# Patient Record
Sex: FEMALE | Race: WHITE | HISPANIC OR LATINO | URBAN - METROPOLITAN AREA
[De-identification: names, ages, dates, MRNs, and addresses within clinical notes are randomized per-mention and may not be internally consistent; named-entity substitution may affect disease eponyms.]

---

## 2022-01-24 ENCOUNTER — TELEPHONE (OUTPATIENT)
Dept: OBSTETRICS AND GYNECOLOGY | Facility: CLINIC | Age: 22
End: 2022-01-24

## 2022-01-24 NOTE — TELEPHONE ENCOUNTER
----- Message from Marta Mae MA sent at 1/24/2022  9:46 AM CST -----  Type:  Needs Medical Advice    Who Called: pt  Regarding: returning call to Kathleen  Would the patient rather a call back or a response via [facility]? Call back  Best Call Back Number: [phone]  Additional Information: n/a

## 2022-01-24 NOTE — TELEPHONE ENCOUNTER
----- Message from Rosa Velasquez sent at 1/24/2022  9:19 AM CST -----  Regarding: call back  Contact: [phone]  Type:  Sooner Apoointment Request    Caller is requesting a sooner appointment.  Caller declined first available appointment listed below.  Caller will not accept being placed on the waitlist and is requesting a message be sent to doctor.  Name of Caller: PT   When is the first available appointment? April   Symptoms: Annual confirm preg self pay   Would the patient rather a call back or a response via [facility]? Call back   Best Call Back Number: [phone]  Additional Information: no

## 2022-02-17 ENCOUNTER — TELEPHONE (OUTPATIENT)
Dept: OBSTETRICS AND GYNECOLOGY | Facility: CLINIC | Age: 22
End: 2022-02-17

## 2022-02-17 NOTE — TELEPHONE ENCOUNTER
----- Message from Nava Hodges sent at 2/15/2022  3:10 PM CST -----  Type:  Needs Medical Advice    Who Called: pt  Symptoms (please be specific): would like to schedule appt for new pregnant    How long has patient had these symptoms:    Pharmacy name and phone #:    Would the patient rather a call back or a response via [facility]? call  Best Call Back Number: [phone]  Additional Information: pt want to be seen before 10 weeks

## 2022-02-28 ENCOUNTER — HOSPITAL ENCOUNTER (EMERGENCY)
Facility: HOSPITAL | Age: 22
Discharge: HOME | End: 2022-02-28
Attending: FAMILY MEDICINE

## 2022-02-28 VITALS
BODY MASS INDEX: 28.44 KG/M2 | DIASTOLIC BLOOD PRESSURE: 58 MMHG | OXYGEN SATURATION: 98 % | HEIGHT: 64 IN | SYSTOLIC BLOOD PRESSURE: 110 MMHG | HEART RATE: 82 BPM | RESPIRATION RATE: 19 BRPM | WEIGHT: 166.56 LBS | TEMPERATURE: 99 F

## 2022-02-28 DIAGNOSIS — R10.9 ABDOMINAL PAIN AFFECTING PREGNANCY: Primary | ICD-10-CM

## 2022-02-28 DIAGNOSIS — O26.899 ABDOMINAL PAIN AFFECTING PREGNANCY: Primary | ICD-10-CM

## 2022-02-28 LAB
ABO GROUP BLD: NORMAL
ALBUMIN SERPL BCP-MCNC: 4.3 G/DL (ref 3.5–5.2)
ALP SERPL-CCNC: 53 U/L (ref 38–126)
ALT SERPL W/O P-5'-P-CCNC: 11 U/L (ref 10–44)
ANION GAP SERPL CALC-SCNC: 9 MMOL/L (ref 8–16)
AST SERPL-CCNC: 20 U/L (ref 15–46)
B-HCG UR QL: POSITIVE
BACTERIA GENITAL QL WET PREP: ABNORMAL
BASOPHILS # BLD AUTO: 0.05 K/UL (ref 0–0.2)
BASOPHILS NFR BLD: 0.4 % (ref 0–1.9)
BILIRUB SERPL-MCNC: 0.5 MG/DL (ref 0.1–1)
BILIRUB UR QL STRIP: NEGATIVE
CALCIUM SERPL-MCNC: 9.4 MG/DL (ref 8.7–10.5)
CHLORIDE SERPL-SCNC: 102 MMOL/L (ref 95–110)
CLARITY UR REFRACT.AUTO: ABNORMAL
CLUE CELLS VAG QL WET PREP: ABNORMAL
CO2 SERPL-SCNC: 25 MMOL/L (ref 23–29)
COLOR UR AUTO: ABNORMAL
CREAT SERPL-MCNC: 0.63 MG/DL (ref 0.5–1.4)
DIFFERENTIAL METHOD: ABNORMAL
EOSINOPHIL # BLD AUTO: 0.1 K/UL (ref 0–0.5)
EOSINOPHIL NFR BLD: 0.8 % (ref 0–8)
ERYTHROCYTE [DISTWIDTH] IN BLOOD BY AUTOMATED COUNT: 11.9 % (ref 11.5–14.5)
EST. GFR  (AFRICAN AMERICAN): >60 ML/MIN/1.73 M^2
EST. GFR  (NON AFRICAN AMERICAN): >60 ML/MIN/1.73 M^2
FILAMENT FUNGI VAG WET PREP-#/AREA: ABNORMAL
GLUCOSE SERPL-MCNC: 86 MG/DL (ref 70–110)
GLUCOSE UR QL STRIP: NEGATIVE
HCG INTACT+B SERPL-ACNC: NORMAL MIU/ML
HCT VFR BLD AUTO: 38 % (ref 37–48.5)
HGB BLD-MCNC: 13 G/DL (ref 12–16)
HGB UR QL STRIP: ABNORMAL
IMM GRANULOCYTES # BLD AUTO: 0.02 K/UL (ref 0–0.04)
IMM GRANULOCYTES NFR BLD AUTO: 0.2 % (ref 0–0.5)
KETONES UR QL STRIP: NEGATIVE
LEUKOCYTE ESTERASE UR QL STRIP: NEGATIVE
LIPASE SERPL-CCNC: 48 U/L (ref 23–300)
LYMPHOCYTES # BLD AUTO: 2 K/UL (ref 1–4.8)
LYMPHOCYTES NFR BLD: 17.5 % (ref 18–48)
MCH RBC QN AUTO: 32.2 PG (ref 27–31)
MCHC RBC AUTO-ENTMCNC: 34.2 G/DL (ref 32–36)
MCV RBC AUTO: 94 FL (ref 82–98)
MONOCYTES # BLD AUTO: 0.7 K/UL (ref 0.3–1)
MONOCYTES NFR BLD: 6.3 % (ref 4–15)
NEUTROPHILS # BLD AUTO: 8.6 K/UL (ref 1.8–7.7)
NEUTROPHILS NFR BLD: 74.8 % (ref 38–73)
NITRITE UR QL STRIP: NEGATIVE
NRBC BLD-RTO: 0 /100 WBC
PH UR STRIP: 6 [PH] (ref 5–8)
PLATELET # BLD AUTO: 197 K/UL (ref 150–450)
PMV BLD AUTO: 11.6 FL (ref 9.2–12.9)
POTASSIUM SERPL-SCNC: 3.5 MMOL/L (ref 3.5–5.1)
PROT SERPL-MCNC: 7.6 G/DL (ref 6–8.4)
PROT UR QL STRIP: ABNORMAL
RBC # BLD AUTO: 4.04 M/UL (ref 4–5.4)
RH BLD: NORMAL
SODIUM SERPL-SCNC: 136 MMOL/L (ref 136–145)
SP GR UR STRIP: 1.01 (ref 1–1.03)
SPECIMEN SOURCE: ABNORMAL
T VAGINALIS GENITAL QL WET PREP: ABNORMAL
URN SPEC COLLECT METH UR: ABNORMAL
UROBILINOGEN UR STRIP-ACNC: NEGATIVE EU/DL
UUN UR-MCNC: 8 MG/DL (ref 7–17)
WBC # BLD AUTO: 11.44 K/UL (ref 3.9–12.7)
WBC #/AREA VAG WET PREP: ABNORMAL
YEAST GENITAL QL WET PREP: ABNORMAL

## 2022-02-28 PROCEDURE — 85025 COMPLETE CBC W/AUTO DIFF WBC: CPT | Mod: ER | Performed by: FAMILY MEDICINE

## 2022-02-28 PROCEDURE — 87491 CHLMYD TRACH DNA AMP PROBE: CPT | Mod: ER | Performed by: FAMILY MEDICINE

## 2022-02-28 PROCEDURE — 99285 EMERGENCY DEPT VISIT HI MDM: CPT | Mod: 25,ER

## 2022-02-28 PROCEDURE — 81025 URINE PREGNANCY TEST: CPT | Mod: ER | Performed by: FAMILY MEDICINE

## 2022-02-28 PROCEDURE — 80053 COMPREHEN METABOLIC PANEL: CPT | Mod: ER | Performed by: FAMILY MEDICINE

## 2022-02-28 PROCEDURE — 86901 BLOOD TYPING SEROLOGIC RH(D): CPT | Performed by: FAMILY MEDICINE

## 2022-02-28 PROCEDURE — 96360 HYDRATION IV INFUSION INIT: CPT | Mod: ER

## 2022-02-28 PROCEDURE — 83690 ASSAY OF LIPASE: CPT | Mod: ER | Performed by: FAMILY MEDICINE

## 2022-02-28 PROCEDURE — 81003 URINALYSIS AUTO W/O SCOPE: CPT | Mod: ER | Performed by: FAMILY MEDICINE

## 2022-02-28 PROCEDURE — 25000003 PHARM REV CODE 250: Mod: ER | Performed by: PHYSICIAN ASSISTANT

## 2022-02-28 PROCEDURE — 87591 N.GONORRHOEAE DNA AMP PROB: CPT | Mod: ER | Performed by: FAMILY MEDICINE

## 2022-02-28 PROCEDURE — 87210 SMEAR WET MOUNT SALINE/INK: CPT | Mod: ER | Performed by: FAMILY MEDICINE

## 2022-02-28 PROCEDURE — 84702 CHORIONIC GONADOTROPIN TEST: CPT | Mod: ER | Performed by: FAMILY MEDICINE

## 2022-02-28 RX ORDER — FAMOTIDINE 20 MG
1 TABLET ORAL DAILY
Qty: 30 TABLET | Refills: 0 | Status: SHIPPED | OUTPATIENT
Start: 2022-02-28 | End: 2023-02-28

## 2022-02-28 RX ORDER — METRONIDAZOLE 7.5 MG/G
1 GEL VAGINAL NIGHTLY
Qty: 5 APPLICATOR | Refills: 0 | Status: SHIPPED | OUTPATIENT
Start: 2022-02-28 | End: 2022-03-05

## 2022-02-28 RX ADMIN — SODIUM CHLORIDE 1000 ML: 0.9 INJECTION, SOLUTION INTRAVENOUS at 11:02

## 2022-02-28 NOTE — ED PROVIDER NOTES
"Encounter Date: 2/28/2022       History     Chief Complaint   Patient presents with    Abdominal Pain     Pt reports abd pain and "pinching." Pt pregnant. States "I knew about pain and pinching since I was pregnant." LMP 12-25-21. Denies vaginal bleeding, reports white discharge. PA in triage.     21-year-old female presents emergency department for evaluation of right-sided pelvic pain and positive pregnancy test.  She reports via tele  that her last menstrual period was on 12/25/2021.  She reports that the pelvic pain has been an achy, intermittently "pinching" pain in her right pelvis since her last menstrual cycle.  She reports that she took a home pregnancy test and was positive.  She reports that she has attempted to set up an appointment with a OBGYN, however reports that she cannot get an appointment until she is at least 10 weeks gestation.  She states that she is approximately 9 weeks today.  She denies any vaginal bleeding.  She does report an intermittent white vaginal discharge.  She denies any fever, headache, dizziness, neck pain, chest pain, shortness of breath, nausea, vomiting, diarrhea, flank pain or dysuria.  No treatment was attempted at home or prior to arrival today.  She reports that this is her 1st pregnancy.        Review of patient's allergies indicates:  No Known Allergies  Past Medical History:   Diagnosis Date    Dysmenorrhea, unspecified      Past Surgical History:   Procedure Laterality Date    APPENDECTOMY      TONSILLECTOMY       History reviewed. No pertinent family history.  Social History     Tobacco Use    Smoking status: Former Smoker    Smokeless tobacco: Never Used   Substance Use Topics    Alcohol use: Not Currently    Drug use: Never     Review of Systems   Constitutional: Negative for activity change, appetite change and fever.   HENT: Negative for congestion, rhinorrhea, sore throat, trouble swallowing and voice change.    Eyes: Negative for photophobia " and visual disturbance.   Respiratory: Negative for cough, chest tightness, shortness of breath and wheezing.    Cardiovascular: Negative for chest pain.   Gastrointestinal: Negative for abdominal pain, constipation, diarrhea, nausea and vomiting.   Genitourinary: Positive for pelvic pain and vaginal discharge. Negative for decreased urine volume, dysuria, flank pain, frequency, menstrual problem, vaginal bleeding and vaginal pain.   Musculoskeletal: Negative for back pain and neck pain.   Skin: Negative for rash.   Neurological: Negative for dizziness, syncope and headaches.       Physical Exam     Initial Vitals [02/28/22 1004]   BP Pulse Resp Temp SpO2   (!) 153/67 103 19 98.7 °F (37.1 °C) 100 %      MAP       --         Physical Exam    Nursing note and vitals reviewed.  Constitutional: She appears well-developed and well-nourished. She is not diaphoretic. No distress.   HENT:   Head: Normocephalic and atraumatic.   Right Ear: External ear normal.   Left Ear: External ear normal.   Nose: Nose normal.   Mouth/Throat: Oropharynx is clear and moist.   Eyes: Conjunctivae and EOM are normal. Pupils are equal, round, and reactive to light.   Neck: Neck supple.   Normal range of motion.  Cardiovascular: Normal rate, regular rhythm and normal heart sounds.   Pulmonary/Chest: Breath sounds normal. No respiratory distress. She has no wheezes. She has no rhonchi. She has no rales. She exhibits no tenderness.   Abdominal: Abdomen is soft. Bowel sounds are normal. She exhibits no distension. There is no abdominal tenderness.   No right CVA tenderness.  No left CVA tenderness. There is no rebound.   Musculoskeletal:      Cervical back: Normal range of motion and neck supple.     Lymphadenopathy:     She has no cervical adenopathy.   Neurological: She is alert and oriented to person, place, and time.   Skin: Skin is warm and dry.   Psychiatric: She has a normal mood and affect.         ED Course   Procedures  Labs Reviewed    PREGNANCY TEST, URINE RAPID - Abnormal; Notable for the following components:       Result Value    Preg Test, Ur Positive (*)     All other components within normal limits    Narrative:     Specimen Source->Urine   URINALYSIS, REFLEX TO URINE CULTURE - Abnormal; Notable for the following components:    Appearance, UA Hazy (*)     Protein, UA Trace (*)     Occult Blood UA Trace (*)     All other components within normal limits    Narrative:     Preferred Collection Type->Urine, Clean Catch  Specimen Source->Urine  Collection Type->Urine, Clean Catch   CBC W/ AUTO DIFFERENTIAL - Abnormal; Notable for the following components:    MCH 32.2 (*)     Gran # (ANC) 8.6 (*)     Gran % 74.8 (*)     Lymph % 17.5 (*)     All other components within normal limits    Narrative:     Release to patient->Immediate   VAGINAL SCREEN - Abnormal; Notable for the following components:    Clue Cells Rare (*)     WBC - Vaginal Screen Occasional (*)     Bacteria - Vaginal Screen Moderate (*)     All other components within normal limits    Narrative:     Specimen Source->Vagina  Release to patient->Immediate   C. TRACHOMATIS/N. GONORRHOEAE BY AMP DNA   COMPREHENSIVE METABOLIC PANEL    Narrative:     Release to patient->Immediate   HCG, QUANTITATIVE    Narrative:     Release to patient->Immediate   LIPASE    Narrative:     Release to patient->Immediate   GROUP & RH          Imaging Results          US OB <14 Wks, TransAbd, Single Gestation (Final result)  Result time 02/28/22 11:15:26   Procedure changed from US OB <14 Wks TransAbd & TransVag, Single Gestation (XPD)     Final result by Gerardo Menjivar MD (02/28/22 11:15:26)                 Impression:      Single live intrauterine pregnancy with estimated gestational age 9 weeks 2 days.    6.4 cm right ovarian simple cyst.  Recommend attention on expected ultrasound follow-up.      Electronically signed by: Gerardo Menjivar  Date:    02/28/2022  Time:    11:15             Narrative:     EXAMINATION:  US OB <14 WEEKS TRANSABDOM, SINGLE GESTATION    CLINICAL HISTORY:  abd pain;    TECHNIQUE:  Transabdominal sonography of the pelvis was performed, followed by transvaginal sonography to better evaluate the uterus and ovaries.    COMPARISON:  None.    FINDINGS:  Intrauterine gestation(s): Single    Gestational sac: Present with satisfactory appearance    Yolk sac: Present with satisfactory appearance    Crown-rump length (CRL): 2.5 cm    Cardiac activity: 172 bpm    Subchorionic hemorrhage: None.    Right ovary: 6.4 x 5.2 x 4.9 cm simple cyst.  Otherwise unremarkable.    Left ovary: Unremarkable.    Miscellaneous: No Free Fluid.                                 Medications   sodium chloride 0.9% bolus 1,000 mL (0 mLs Intravenous Stopped 22 1209)     Medical Decision Making:   Initial Assessment:   21-year-old female presents emergency department for evaluation of right-sided pelvic pain and positive home pregnancy test.  Physical exam reveals a nontoxic-appearing female in no acute distress.  Patient is afebrile vital signs within normal limits.  Neurological exam reveals alert and oriented patient.  Neck is supple, nontender to palpation.  Lungs clear to auscultation bilaterally.  Abdominal exam reveals soft abdomen, nontender to palpation.  No CVA tenderness noted.   exam reveals  Differential Diagnosis:   Ultrasound ordered to assess possible ectopic versus threatened   Urinary tract infection  Gonorrhea  Chlamydia  Trichomonas  Bacterial vaginosis  Candidal vaginitis    ED Management:  UPT positive.  Vaginal screen reveals evidence of her clue cells.  CBC reveals no acute leukocytosis or anemia.  CMP results within normal limits.  Beta HCG 005300. Lipase 48.  ABO/Rh pending.  As patient is not having any bleeding, no RhoGAM will be indicated today.  Urinalysis reveals no evidence of urinary tract infection.  Ultrasound reveals single living IUP approximately 9 weeks 3 days.  6.4 cm  right ovarian simple cyst noted.  Discussed these findings at length with the patient via tele .  Patient verbalizes understanding and agreement course of treatment.  Instructed patient to follow up with her OBGYN for re-evaluation and establishment of prenatal care.  Instructed patient to return to the emergency department immediately for any new or worsening symptoms.  Discussed this patient at length with Dr. Rojas who is in agreement course of treatment.                      Clinical Impression:   Final diagnoses:  [O26.899, R10.9] Abdominal pain affecting pregnancy (Primary)          ED Disposition Condition    Discharge Stable        ED Prescriptions     Medication Sig Dispense Start Date End Date Auth. Provider    prenatal 21-iron fu-folic acid (PRENATAL COMPLETE) 14 mg iron- 400 mcg Tab Take 1 tablet by mouth once daily. 30 tablet 2/28/2022 2/28/2023 Tonya Latham PA-C    metroNIDAZOLE (METROGEL) 0.75 % vaginal gel Place 1 applicator vaginally every evening. for 5 days 5 applicator 2/28/2022 3/5/2022 Tonya Latham PA-C        Follow-up Information    None          Tonya Latham PA-C  02/28/22 [id number]

## 2022-02-28 NOTE — Clinical Note
"Nicko Reid" Russ Shipman was seen and treated in our emergency department on 2/28/2022.  She may return to work on 03/02/2022.       If you have any questions or concerns, please don't hesitate to call.      RON Romero RN    "

## 2022-02-28 NOTE — DISCHARGE INSTRUCTIONS
You are advised to establish prenatal care with your OBGYN.  You are instructed to return to the emergency department immediately for any new worsening symptoms.

## 2022-03-01 LAB
C TRACH DNA SPEC QL NAA+PROBE: NOT DETECTED
N GONORRHOEA DNA SPEC QL NAA+PROBE: NOT DETECTED